# Patient Record
Sex: MALE | Race: OTHER | HISPANIC OR LATINO
[De-identification: names, ages, dates, MRNs, and addresses within clinical notes are randomized per-mention and may not be internally consistent; named-entity substitution may affect disease eponyms.]

---

## 2019-12-06 ENCOUNTER — APPOINTMENT (OUTPATIENT)
Dept: INTERNAL MEDICINE | Facility: CLINIC | Age: 49
End: 2019-12-06

## 2023-04-18 ENCOUNTER — NON-APPOINTMENT (OUTPATIENT)
Age: 53
End: 2023-04-18

## 2023-04-19 ENCOUNTER — APPOINTMENT (OUTPATIENT)
Dept: INTERNAL MEDICINE | Facility: CLINIC | Age: 53
End: 2023-04-19
Payer: COMMERCIAL

## 2023-04-19 ENCOUNTER — LABORATORY RESULT (OUTPATIENT)
Age: 53
End: 2023-04-19

## 2023-04-19 ENCOUNTER — APPOINTMENT (OUTPATIENT)
Dept: UROLOGY | Facility: CLINIC | Age: 53
End: 2023-04-19
Payer: COMMERCIAL

## 2023-04-19 VITALS
WEIGHT: 197 LBS | DIASTOLIC BLOOD PRESSURE: 81 MMHG | SYSTOLIC BLOOD PRESSURE: 134 MMHG | OXYGEN SATURATION: 98 % | HEIGHT: 67 IN | HEART RATE: 83 BPM | BODY MASS INDEX: 30.92 KG/M2 | TEMPERATURE: 96.6 F

## 2023-04-19 DIAGNOSIS — Z91.09 OTHER ALLERGY STATUS, OTHER THAN TO DRUGS AND BIOLOGICAL SUBSTANCES: ICD-10-CM

## 2023-04-19 DIAGNOSIS — Z78.9 OTHER SPECIFIED HEALTH STATUS: ICD-10-CM

## 2023-04-19 DIAGNOSIS — R35.1 NOCTURIA: ICD-10-CM

## 2023-04-19 DIAGNOSIS — R00.1 BRADYCARDIA, UNSPECIFIED: ICD-10-CM

## 2023-04-19 DIAGNOSIS — Z80.3 FAMILY HISTORY OF MALIGNANT NEOPLASM OF BREAST: ICD-10-CM

## 2023-04-19 DIAGNOSIS — Z83.3 FAMILY HISTORY OF DIABETES MELLITUS: ICD-10-CM

## 2023-04-19 DIAGNOSIS — Z87.19 PERSONAL HISTORY OF OTHER DISEASES OF THE DIGESTIVE SYSTEM: ICD-10-CM

## 2023-04-19 DIAGNOSIS — Z91.018 ALLERGY TO OTHER FOODS: ICD-10-CM

## 2023-04-19 DIAGNOSIS — N40.3 NODULAR PROSTATE WITH LOWER URINARY TRACT SYMPTOMS: ICD-10-CM

## 2023-04-19 DIAGNOSIS — Z23 ENCOUNTER FOR IMMUNIZATION: ICD-10-CM

## 2023-04-19 DIAGNOSIS — Z00.00 ENCOUNTER FOR GENERAL ADULT MEDICAL EXAMINATION W/OUT ABNORMAL FINDINGS: ICD-10-CM

## 2023-04-19 DIAGNOSIS — D12.6 BENIGN NEOPLASM OF COLON, UNSPECIFIED: ICD-10-CM

## 2023-04-19 DIAGNOSIS — N40.0 BENIGN PROSTATIC HYPERPLASIA WITHOUT LOWER URINARY TRACT SYMPMS: ICD-10-CM

## 2023-04-19 DIAGNOSIS — N13.8 NODULAR PROSTATE WITH LOWER URINARY TRACT SYMPTOMS: ICD-10-CM

## 2023-04-19 DIAGNOSIS — Z80.42 FAMILY HISTORY OF MALIGNANT NEOPLASM OF PROSTATE: ICD-10-CM

## 2023-04-19 LAB
APPEARANCE: CLEAR
BILIRUBIN URINE: NEGATIVE
BLOOD URINE: NEGATIVE
COLOR: COLORLESS
GLUCOSE QUALITATIVE U: NEGATIVE
KETONES URINE: NEGATIVE
LEUKOCYTE ESTERASE URINE: NEGATIVE
NITRITE URINE: NEGATIVE
PH URINE: 6.5
PROTEIN URINE: NEGATIVE
SPECIFIC GRAVITY URINE: 1
UROBILINOGEN URINE: NORMAL

## 2023-04-19 PROCEDURE — 90471 IMMUNIZATION ADMIN: CPT

## 2023-04-19 PROCEDURE — 90715 TDAP VACCINE 7 YRS/> IM: CPT

## 2023-04-19 PROCEDURE — 99386 PREV VISIT NEW AGE 40-64: CPT | Mod: 25

## 2023-04-19 PROCEDURE — 99204 OFFICE O/P NEW MOD 45 MIN: CPT

## 2023-04-19 PROCEDURE — 99212 OFFICE O/P EST SF 10 MIN: CPT | Mod: 25

## 2023-04-19 PROCEDURE — 93000 ELECTROCARDIOGRAM COMPLETE: CPT | Mod: 59

## 2023-04-19 NOTE — HISTORY OF PRESENT ILLNESS
[FreeTextEntry1] : Evaluation of BPH, nocturia, prominent prostate nodule on DREVery pleasant 52-year-old gentleman who presents for \par Nocturia x 2-3. Started OTC saw palmetto which helps. Drinks a lot of water.\par \par PSA 2021 0.6. A1c 5.7.

## 2023-04-19 NOTE — REVIEW OF SYSTEMS
[Negative] : Heme/Lymph [Nocturia] : nocturia [Frequency] : frequency [FreeTextEntry8] : wakes up 3 times a night to urinate

## 2023-04-19 NOTE — ASSESSMENT
[FreeTextEntry1] : Very pleasant 52-year-old gentleman who presents for evaluation of BPH, nocturia, prominent right lateral prostate on JEFFERSON\par -PSA today\par -A1c today\par -CMP\par -Urinalysis\par -Discussed the natural history of BPH, as well as typical symptoms of an enlarged prostate. Discussed potential complications that could arise from BPH, including but not limited to urinary tract infections, bladder stones, and renal impairment.\par -We discussed medication options for BPH, however at this time he wishes to continue saw palmetto with which I agree\par -Follow-up in 6 months

## 2023-04-19 NOTE — PHYSICAL EXAM
[Well Developed] : well developed [Normal Verbal Skills] : the patient had normal verbal communication skills [Normal Nonverbal Skills] : normal nonverbal communication skills were demonstrated [Conjunctiva] : the conjunctiva were normal in both eyes [PERRL] : pupils were equal in size, round, and reactive to light [EOM Intact] : extraocular movements were intact [Normal Appearance] : was normal in appearance [Neck Supple] : was supple [Normal Rhythm/Effort] : normal respiratory rhythm and effort [Clear Bilaterally] : the lungs were clear to auscultation bilaterally [Normal to Percussion] : the lungs were normal to percussion [5th Left ICS - MCL] : palpated at the 5th LICS in the midclavicular line [Heart Rate ___] : [unfilled] bpm [Rhythm Regular] : regular [Normal Rate] : normal [Normal S1] : normal S1 [Normal S2] : normal S2 [No Murmur] : no murmurs heard [No Pitting Edema] : no pitting edema present [2+] : left 2+ [No Abnormalities] : the abdominal aorta was not enlarged and no bruit was heard [Examination Of The Breasts] : a normal appearance [No Discharge] : no discharge [Soft, Nontender] : the abdomen was soft and nontender [No Mass] : no masses were palpated [No HSM] : no hepatosplenomegaly noted [None] : no CVA tenderness [Normal rectal exam] : was normal [Penis Abnormality] : normal uncircumcised penis [Testes Tenderness] : no tenderness of the testes [Testes Mass (___cm)] : there were no testicular masses [Prostate Nodule] : had a nodule [Prostate Enlarged] : was enlarged [No Lymphangitis] : no lymphangitis observed [Normal Kyphosis] : normal kyphosis [No Visual Abnormalities] : no visible abnormalities [Normal Lordosis] : normal lordosis [No Scoliosis] : no scoliosis [No Tenderness to Palpation] : no spine tenderness on palpation [No Masses] : no masses [Full ROM] : full ROM [No Pain with ROM] : no pain with motion in any direction [Intact] : all reflexes within normal limits bilaterally [Normal Station and Gait] : the gait and station were normal [Normal Motor Tone] : the muscle tone was normal [Involuntary Movements] : no involuntary movements were seen [Normal Scalp] : inspection of the scalp showed no abnormalities [Examination Of The Hair] : texture and distribution of hair was normal [Complexion Medium] : medium complexion [Normal] : the deep tendon reflexes were normal [Normal Mental Status] : the patient's orientation, memory, attention, language and fund of knowledge were normal [Appropriate] : appropriate [Well Nourished] : not well nourished [Enlarged Diffusely] : was not enlarged [JVP Elevated ___cm] : the JVP was not elevated [S3] : no S3 [S4] : no S4 [Rt] : no varicose veins of the right leg [Lt] : no varicose veins of the left leg [Right Carotid Bruit] : no bruit heard over the right carotid [Left Carotid Bruit] : no bruit heard over the left carotid [Right Femoral Bruit] : no bruit heard over the right femoral artery [Left Femoral Bruit] : no bruit heard over the left femoral artery [Bruit] : no bruit heard [Occult Blood Positive] : was negative for occult blood [Postauricular Lymph Nodes Enlarged Bilaterally] : nodes not enlarged [Preauricular Lymph Nodes Enlarged Bilaterally] : nodes not enlarged [Submandibular Lymph Nodes Enlarged Bilaterally] : nodes not enlarged [Suboccipital Lymph Nodes Enlarged Bilaterally] : nodes not enlarged [Submental Lymph Nodes Enlarged] : nodes not enlarged [Cervical Lymph Nodes Enlarged Posterior Bilaterally] : nodes not enlarged [Cervical Lymph Nodes Enlarged Anterior Bilaterally] : nodes not enlarged [Supraclavicular Lymph Nodes Enlarged Bilaterally] : nodes not enlarged [Axillary Lymph Nodes Enlarged Bilaterally] : nodes not enlarged [Epitrochlear Lymph Nodes Enlarged Bilaterally] : nodes not enlarged [Femoral Lymph Nodes Enlarged Bilaterally] : nodes not enlarged [Inguinal Lymph Nodes Enlarged Bilaterally] : nodes not enlarged [Abnormal Color] : normal color and pigmentation [Skin Lesions 1] : no skin lesions were observed [Tattoo - Single] : no tattoos observed [Skin Turgor Decreased] : normal skin turgor [Impaired judgment] : intact judgment [Impaired Insight] : intact insight [de-identified] : tongue normal  teeth in good repair

## 2023-04-19 NOTE — ASSESSMENT
[FreeTextEntry1] :   health  -    Pt needs dtap vaccine   had covid vaccines and will bring dates  also had colonoscopy  possible polyp and will obtain reports for me  .He is up to date with  eye and dental exams.  \par 2  bmi 30  Weight loss, exercise, and diet control were discussed and are highly encouraged. Treatment options were given such as, aqua therapy, and contacting a nutritionist. Recommended to use the elliptical, stationary bike, less use of treadmill. Mindful eating was explained to the patient Obesity is associated with worsening asthma, shortness of breath, and potential for cardiac disease, diabetes, and other underlying medical conditions.\par \par \par   pt should eat 60-70  gms of protein a day or 20-30 gms per meal This is fish chicken eggs lean meat such as chicken turkey pork and beef .  - Pt should not eat more than a 200 calorie snack  and make sure they are protein and fiber rich. he should eat 7 serving of protein, 12 servings of carbohydrates and 3 servings of non starchy vegetables and 4 servings of fat Dont eat unless you are physically hungry and never eat in front of a TV go to the kitchen table.  he should not eat more than a 1500 calories per day.\par 3  allergies  Once a person's trigger(s) have been identified, the next step is to reduce exposure to those specific allergens. Triggers may be present at work or at home, although for most people, the home environment is the primary source. It is especially important to reduce exposure to triggers in the bedroom because most people spend a significant number of hours there. However, to be effective, changes must be made throughout the entire home Dust mites — Dust mites are a microscopic type of insect that live in bedding, sofas, carpets, or any woven material. Dust mites do not bite and do not cause harm to humans, other than by triggering allergies.\par Mites absorb humidity from the atmosphere (ie, they do not drink) and feed on organic matter (including shed human and animal skin). They require sufficient humidity and nests to live in (which are not visible with the naked eye). Dust mite infestation is less common in dry climates, such as the [place].\par Exposure to dust mites can be reduced by encasing pillows, mattresses, box springs, comforters, and furniture in mite-impermeable barriers. When covering crib mattresses and children's mattresses and pillows, only tight-fitting, commercial covers intended for this purpose should be used. Homemade covers (for example, plastic sheeting fastened with duct tape) should not be used in children's beds, as these can come apart, and children can become trapped or suffocate. Tightly-woven fabrics with a pore size of 6 microns or less are very effective at controlling the passage of mite as well as cat allergens. Fabrics with a pore size greater than 2 microns still permit airflow Mites can be eliminated by washing sheets and blankets weekly in warm water with detergent or by drying them in an electric dryer on the hot setting Exposure can be further reduced by vacuuming with a vacuum  equipped with a high-efficiency particulate air (HEPA) filter, dusting regularly, and not sleeping on upholstered furniture (eg, couches). However, studies have yet to show that physical or chemical cleaning methods reduce mite levels to a degree that improves symptoms Indoor humidity levels should be kept between 30 and 50 percent. Inexpensive humidity monitors can be purchased at most [employer] stores. Humidifiers make the problem worse and are not recommended.\par When possible, the amount of clutter, carpet, upholstered furniture, and drapes should be minimized, and horizontal blinds should be eliminated in the rooms where the person spends the most time (bedroom, study, television room). Washable vinyl, roller-type shades are optimal. For children, the number of stuffed toys in the bedroom should be minimized.\par Animal dander — Animal dander is made up of the dead skin cells or scales (like dandruff) that are constantly shed by animals. Any breed of dog or cat is capable of being allergenic, although the levels given off by individual animals may vary to some degree. In cats, the protein that causes most people's allergies is found in the cat's saliva, skin glands, and urinary/reproductive tract. Accordingly, short-haired cats are not necessarily less allergenic than long-haired animals, and furless cats have allergen levels similar to furred cats.\par Other animals, such as rodents, birds, and ferrets, can also trigger symptoms in an allergic individual. Pets without feathers or fur, such as reptiles, turtles, and fish, rarely cause allergy, although deposits of fish food that build up under the covers of fish tanks are an excellent source of food for dust mite colonies.\par If a person is found to be allergic to a pet, the most effective option is to remove the pet from the home. Limiting an animal to a certain area in the house is not effective, because allergens are carried on clothing or spread in the air. Once a pet has left a home, careful cleaning (or removal) of carpets, sofas, curtains, and bedding must follow. This is particularly true for cat allergens because they are "sticky" and adhere to a variety of indoor surfaces. Even after a cat has been removed from a home and it has been thoroughly cleaned, it can take months for the level of cat allergen to drop. For this reason, it may take months for the person's symptoms to fully reflect the absence of the pet.\par If it is not possible to remove the animal, measures can be taken to decrease exposure to the animal dander (although none of these methods are as effective as removing the animal. Vacuum  with a HEPA filter are effective in reducing cat and dog allergen levels in the home and can reduce symptoms Rodents — Mice and rats have proteins in their urine that can cause allergies. This applies to rodents that live in a laboratory setting, as well as rodents that live in the wild.\par To reduce rodent allergen levels significantly, a combination of pest control methods, in addition to pesticides (eg, poison baits), are usually necessary. This includes keeping food and trash in covered containers, cleaning food scraps from the floor and countertops, and sealing cracks in the walls, doors, and floors.\par Cockroaches — Cockroach droppings contain allergens that can trigger asthma and allergic rhinitis in sensitive individuals. Cockroaches thrive in warm, moist environments with easily accessible food and water. Unfortunately, efforts to control cockroach populations in infested areas are often less than successful. Still, certain measures are recommended:\par ?Use multiple baited traps or poisons\par ?Remove garbage and food waste promptly from the home\par ?Wash dishes and cooking utensils immediately after use\par ?Remove cockroach debris quickly\par ?Eliminate any standing water from leaking faucets or drains\par ?Keep humidity levels less than 50 percent with a dehumidifier or air conditioner\par ?Consult a professional  for large or recurrent infestations\par  ladybugs — Asian ladybugs were previously imported to the [place] as a biologic means of controlling aphids. It was anticipated that the insects would not survive the cold of winter. However, they adapted by moving inside houses when temperatures drop in the early fall. Allergies to Asian ladybugs have been increasingly reported as a source of seasonal indoor respiratory symptoms, particularly chronic cough, rhinitis, and asthma. Most cases have been reported in rural areas of the central, [place]western, and [place]. The insects can also bite and cause local reactions.\par  ladybugs enter homes through external cracks and crevices and then infest spaces within walls. They secrete a brown liquid that may stain walls and produce an unpleasant smell.\par Treating the exterior of the house with a chemical (pyrethroids) before cold weather arrives can prevent swarming of the ladybugs inside the home. Pyrethroids are similar to pyrethrins, which are derived from marigold flowers. Pyrethrins are broken down by the sun and do not significantly affect groundwater quality.\par Indoor molds — Mold spores can trigger symptoms of allergic rhinitis in allergic patients. Mold thrives in damp environments. Areas, such as air conditioning vents, water traps, refrigerator drip trays, shower stalls, leaky sinks, and damp basements, are particularly vulnerable to mold growth if not cleaned regularly. Most of the mold spores enter the home from the outside air. However, under certain circums\par testing will be done \par 4 prostate nodule    pt will see urologist psa     he actual cause of prostate enlargement is unknown. Factors linked to aging and changes in the cells of the testicles may have a role in the growth of the gland. Men who have had their testicles removed at a young age (for example, as a result of testicular cancer) do not develop BPH.\par \par Also, if the testicles are removed after a man develops BPH, the prostate begins to shrink in size.\par \par Some facts about prostate enlargement:\par •The likelihood of developing an enlarged prostate increases with age.\par •BPH is so common that it has been said all men will have an enlarged prostate if they live long enough.\par •A small amount of prostate enlargement is present in many men over age 40. More than 90% of men over age 80 have the condition.\par •No risk factors have been identified other than having normally functioning testicles. \par \par \par \par \par \par \par \par Symptoms\par \par \par \par \par \par \par Less than half of all men with BPH have symptoms of the disease. Symptoms may include:\par •Dribbling at the end of urinating\par •Inability to urinate (urinary retention)\par •Incomplete emptying of your bladder\par •Incontinence\par •Needing to urinate 2 or more times per night\par •Pain with urination or bloody urine (these may indicate infection)\par •Slowed or delayed start of the urinary stream\par •Straining to urinate\par •Strong and sudden urge to urinate\par •Weak urine stream \par \par \par \par \par \par Exams and Tests\par \par \par \par \par \par \par Your health care provider will ask you questions about your medical history and do a digital rectal exam to feel the prostate gland. Other tests you may have include: \par •Urine flow rate\par •Post-void residual urine test to see how much urine is left in your bladder after you urinate\par •Pressure-flow studies to measure the pressure in the bladder as you urinate\par •Urinalysis to check for blood or infection\par •Urine culture to check for infection\par •Prostate-specific antigen (PSA) blood test to screen for prostate cancer\par •Cystoscopy\par \par You may be asked to fill out a form to rate how bad your symptoms are and how much they affect your daily life. Your doctor can use this score to  if your condition is getting worse over time.\par \par \par \par \par \par Treatment\par \par \par \par \par \par \par The treatment you choose will be based on how bad your symptoms are and how much they bother you. Your provider will also take into account other medical problems you may have.\par \par Treatment options include "watchful waiting," lifestyle changes, medicines, or surgery.\par \par If you are over 60, you are more likely to have symptoms. But many men with an enlarged prostate have only minor symptoms. Self-care steps are often enough to make you feel better.\par \par If you have BPH, you should have a yearly exam to monitor your symptoms and see if you need changes in treatment. \par \par SELF-CARE\par \par For mild symptoms:\par •Urinate when you first get the urge. Also, go to the bathroom on a timed schedule even if you don't feel a need to urinate.\par •Avoid alcohol and caffeine, especially after dinner.\par •DO NOT drink a lot of fluid all at once. Spread out fluids during the day. Avoid drinking fluids within 2 hours of bedtime.\par •Try NOT to take over-the-counter cold and sinus medicines that contain decongestants or antihistamines. These drugs can increase BPH symptoms.\par •Keep warm and exercise regularly. Cold weather and lack of physical activity may worsen symptoms.\par •Learn and perform Kegel exercises (pelvic strengthening exercises).\par •Reduce stress. Nervousness and tension can lead to more frequent urination. \par \par MEDICINES\par \par Alpha-1 blockers are a class of drugs that are also used to treat high blood pressure. These medicines relax the muscles of the bladder neck and prostate. This allows easier urination. Most people who take alpha-1 blockers notice improvement in their symptoms.\par \par Finasteride and dutasteride lower levels of hormones produced by the prostate. These drugs also reduce the size of the gland, increase urine flow rate, and decrease symptoms of BPH. You may need to take these medicines for 3 to 6 months before you notice symptoms getting better. Possible side effects include decreased sex drive and impotence.\par \par Antibiotics may be prescribed to treat chronic prostatitis (inflammation of the prostate), which may occur with BPH. BPH symptoms improve in some men after a course of antibiotics.\par \par Watch out for drugs that may make your symptoms worse:\par \par SAW PALMETTO\par \par Many herbs have been tried for treating an enlarged prostate. Many men use saw palmetto to ease symptoms. Some studies have shown that it may help with symptoms, but results are mixed and more research is needed. If you use saw palmetto and think it works, ask your doctor if you should still take it.\par \par SURGERY\par \par Prostate surgery may be recommended if you have:\par •Incontinence\par •Recurrent blood in the urine\par •Inability to fully empty the bladder (urinary retention)\par •Recurrent urinary tract infections\par •Decreasing kidney function\par •Bladder stones\par •Bothersome symptoms not responding to medicines \par \par The choice of which surgical procedure is recommended is most often based on the severity of your symptoms and the size and shape of your prostate gland. Most men who have prostate surgery have improvement in urine flow rates and symptoms.\par \par Transurethral resection of the prostate (TURP): This is the most common and most proven surgical treatment for BPH. TURP is performed by inserting a scope through the penis and removing the prostate piece by piece.\par \par Simple prostatectomy: It is a procedure to remove the inside part of the prostate gland. It is done through a surgical cut in your lower belly. This treatment is most often done on men who have very large prostate glands. \par \par Other, less-invasive procedures use heat to destroy prostate tissue. None have been proven to be better than TURP. People who receive these procedures are more likely to need surgery again after 5 or 10 years. However, these procedures may be a choice for:\par •Younger men (many of the less-invasive procedures carry a lower risk for impotence and incontinence than TURP, although the risk with TURP is not very high)\par •Older people\par •People with severe medical conditions, including uncontrolled diabetes, cirrhosis, alcoholism, psychosis, and serious lung, kidney, or heart disease\par •Men who are taking blood-thinning drugs \par \par \par \par \par \par Support Groups\par \par \par \par \par \par \par Some men may find it helpful to take part in a BPH support groups. \par \par \par \par \par \par Possible Complications\par \par \par \par \par \par \par Men who have had BPH for long time with slowly worsening symptom may develop:\par •Sudden inability to urinate\par •Urinary tract infections\par •Urinary stones\par •Damage to the kidneys\par •Blood in the urine \par \par BPH may come back over time even after having surgery. \par \par \par \par \par \par When to Contact a Medical Professional\par \par \par \par \par \par \par Call your provider right away if you have:\par •Less urine than usual\par •Fever or chills\par •Back, side, or abdominal pain\par •Blood or pus in your urine \par \par Also call if:\par •Your bladder does not feel completely empty after you urinate.\par •You take medicines that may cause urinary problems such as diuretics, antihistamines, antidepressants, or sedatives. DO NOT stop or change your medicines without talking to your doctor.\par •You have tried self-care steps for 2 months and symptoms have not improved. \par Alternative Names\par BPH; Benign prostatic hyperplasia (hypertrophy); Prostate - enlarged\par Patient Instructions\par •Enlarged prostate - what to ask your doctor \par •Prostate resection - minimally invasive - discharge\par •Transurethral resection of the prostate - discharge\par 5.  sinus enio cardia and incomplete rbbb  refer to cardiologist  ekg  rate 56   bradycardia  incomplete rbbb  qrs  102 qt  402/387ms pr  164 ms p  106 ms  \par 6 tdap  vaccine  discussed  risks pros and cons  will have vaccine NAME: Boostrix 5-2.5-18.5 SUSP\par \par GENERIC NAME:  Diphtheria and Tetanus Toxoids, and Acellular Pertussis Vaccine (dif THEER ee a & TET a nus MONA kim, & ay CRISTINO yazan lar per TUS sis vak SEEN) \par \par COMMON USES:  It is used to prevent diphtheria, tetanus, and pertussis. \par \par HOW TO USE THIS MEDICINE:  HOW IS THIS DRUG BEST TAKEN? Use this drug as ordered by your doctor. Read all information given to you. Follow all instructions closely. It is given as a shot into a muscle. HOW DO I STORE AND/OR THROW OUT THIS DRUG? If you need to store this drug at home, talk with your doctor, nurse, or pharmacist about how to store it. WHAT DO I DO IF I MISS A DOSE? Call your doctor to find out what to do. \par \par CAUTIONS:  For all patients taking this drug: Tell all of your health care providers that you take this drug. This includes your doctors, nurses, pharmacists, and dentists. This drug may not protect all people who use it. Talk with the doctor. If you have a latex allergy, talk with your doctor. Some products have latex. Not all brands of vaccines are for children. Talk with your child's doctor. Tell your doctor if you are pregnant, plan on getting pregnant, or are breast-feeding. You will need to talk about the benefits and risks to you and the baby. Infants: If your child was born premature, talk with the doctor. Trouble breathing has happened in these children after getting some vaccines. \par \par POSSIBLE SIDE EFFECTS:  WHAT ARE SOME SIDE EFFECTS THAT I NEED TO CALL MY DOCTOR ABOUT RIGHT AWAY? WARNING/CAUTION: Even though it may be rare, some people may have very bad and sometimes deadly side effects when taking a drug. Tell your doctor or get medical help right away if you have any of the following signs or symptoms that may be related to a very bad side effect: Signs of an allergic reaction, like rash; hives; itching; red, swollen, blistered, or peeling skin with or without fever; wheezing; tightness in the chest or throat; trouble breathing, swallowing, or talking; unusual hoarseness; or swelling of the mouth, face, lips, tongue, or throat. Feeling confused. Very bad dizziness or passing out. Change in eyesight. Seizures. A burning, numbness, or tingling feeling that is not normal. Weakness. Trouble controlling body movements. High fever. WHAT ARE SOME OTHER SIDE EFFECTS OF THIS DRUG? All drugs may cause side effects. However, many people have no side effects or only have minor side effects. Call your doctor or get medical help if any of these side effects or any other side effects bother you or do not go away: For all patients taking this drug: Pain, redness, or swelling where the shot was given. Headache. Feeling tired or weak. Mild fever. Chills. Upset stomach or throwing up. Stomach pain or diarrhea. Joint pain or swelling. Swollen gland. Young children: Feeling fussy. Decreased appetite. Feeling sleepy. Crying that is not normal. These are not all of the side effects that may occur. If you have questions about side effects, call your doctor. Call your doctor for medical advice about side effects. Report side effects to the FDA/CDC Vaccine Adverse Event Reporting System (VAERS) at https://vaers.hhs.gov/reportevent.html or by calling 1-932.281.7183. \par \par BEFORE USING THIS MEDICINE:  WHAT DO I NEED TO TELL MY DOCTOR BEFORE I TAKE THIS DRUG? TELL YOUR DOCTOR: If you are allergic to this drug; any part of this drug; or any other drugs, foods, or substances. Tell your doctor about the allergy and what signs you had. TELL YOUR DOCTOR: If you have seizures or any other brain or nervous system problem. TELL YOUR DOCTOR: If you have had a brain problem like coma, lowered level of awareness, or seizures from an unknown cause within 7 days of a previous vaccine that has pertussis. This is not a list of all drugs or health problems that interact with this drug. Tell your doctor and pharmacist about all of your drugs (prescription or OTC, natural products, vitamins) and health problems. You must check to make sure that it is safe for you to take this drug with all of your drugs and health problems. Do not start, stop, or change the dose of any drug without checking with your doctor. \par \par OVERDOSE:  If you think there has been an overdose, call your poison control center or get medical care right away. Be ready to tell or show what was taken, how much, and when it happened. \par \par ADDITIONAL INFORMATION:  If your symptoms or health problems do not get better or if they become worse, call your doctor. Do not share your drugs with others and do not take anyone else's drugs. Keep all drugs in a safe place. Keep all drugs out of the reach of children and pets. Throw away unused or  drugs. Do not flush down a toilet or pour down a drain unless you are told to do so. Check with your pharmacist if you have questions about the best way to throw out drugs. There may be drug take-back programs in your area. Some drugs may have another patient information leaflet. Check with your pharmacist. If you have any questions about this drug, please talk with your doctor, nurse, pharmacist, or other health care provider. \par \par Copyright 3 [employer] All Rights Reserved.     \par

## 2023-04-19 NOTE — HISTORY OF PRESENT ILLNESS
[FreeTextEntry1] : new pt   [de-identified] : Pt is a  52 yr old man who states when he eats  diary  , cheese he gets hives  and has  seasonal allergies and gets hives  congestion and  watery eye  which he states has improved.  He states he gets  hives  with certain meats as well .  He  -denies any headaches, nausea, vomiting, fever, chills, sweats, chest pain, chest pressure, diarrhea, constipation, dysphagia, sour taste in the mouth, dizziness, leg swelling, leg pain, myalgias, arthralgias, itchy eyes, itchy ears, heartburn, or reflux.\par \par \par

## 2023-04-19 NOTE — HEALTH RISK ASSESSMENT
[Excellent] : ~his/her~ current health as excellent [Very Good] : ~his/her~  mood as very good [Yes] : Yes [2 - 4 times a month (2 pts)] : 2-4 times a month (2 points) [1 or 2 (0 pts)] : 1 or 2 (0 points) [No] : In the past 12 months have you used drugs other than those required for medical reasons? No [Never (0 pts)] : Never (0 points) [No falls in past year] : Patient reported no falls in the past year [0] : 1) Little interest or pleasure doing things: Not at all (0) [PHQ-2 Negative - No further assessment needed] : PHQ-2 Negative - No further assessment needed [Patient reported colonoscopy was abnormal] : Patient reported colonoscopy was abnormal [HIV test declined] : HIV test declined [Hepatitis C test offered] : Hepatitis C test offered [None] : None [With Family] : lives with family [# of Members in Household ___] :  household currently consist of [unfilled] member(s) [Employed] : employed [College] : College [] :  [# Of Children ___] : has [unfilled] children [Sexually Active] : sexually active [Feels Safe at Home] : Feels safe at home [Fully functional (bathing, dressing, toileting, transferring, walking, feeding)] : Fully functional (bathing, dressing, toileting, transferring, walking, feeding) [Fully functional (using the telephone, shopping, preparing meals, housekeeping, doing laundry, using] : Fully functional and needs no help or supervision to perform IADLs (using the telephone, shopping, preparing meals, housekeeping, doing laundry, using transportation, managing medications and managing finances) [Smoke Detector] : smoke detector [Carbon Monoxide Detector] : carbon monoxide detector [Safety elements used in home] : safety elements used in home [Seat Belt] :  uses seat belt [Sunscreen] : uses sunscreen [Never] : Never [FreeTextEntry1] : see above  [Audit-CScore] : 2 [de-identified] :   he exercises  three times a week  ,  weights  follows  routine.    [de-identified] : healthy  [[id number]] : 0 [Change in mental status noted] : No change in mental status noted [Language] : denies difficulty with language [Behavior] : denies difficulty with behavior [Learning/Retaining New Information] : denies difficulty learning/retaining new information [Handling Complex Tasks] : denies difficulty handling complex tasks [Reasoning] : denies difficulty with reasoning [Spatial Ability and Orientation] : denies difficulty with spatial ability and orientation [Reports changes in hearing] : Reports no changes in hearing [Reports changes in vision] : Reports no changes in vision [Reports changes in dental health] : Reports no changes in dental health [Guns at Home] : no guns at home [Travel to Developing Areas] : does not  travel to developing areas [TB Exposure] : is not being exposed to tuberculosis [Caregiver Concerns] : does not have caregiver concerns [ColonoscopyDate] : 10/22 [ColonoscopyComments] : unsure  he thinks he had a polyp [FreeTextEntry2] :   [de-identified] : wears  glasses last eye exam  12/22 [de-identified] : last dental  11/22 [AdvancecareDate] : 4/23 [de-identified] : occ  cigar  once or twice a yr if that

## 2023-04-19 NOTE — COUNSELING
[Fall prevention counseling provided] : Fall prevention counseling provided [Adequate lighting] : Adequate lighting [No throw rugs] : No throw rugs [Use proper foot wear] : Use proper foot wear [Use recommended devices] : Use recommended devices [Sleep ___ hours/day] : Sleep [unfilled] hours/day [Engage in a relaxing activity] : Engage in a relaxing activity [Plan in advance] : Plan in advance [Potential consequences of obesity discussed] : Potential consequences of obesity discussed [Benefits of weight loss discussed] : Benefits of weight loss discussed [Structured Weight Management Program suggested:] : Structured weight management program suggested [Encouraged to maintain food diary] : Encouraged to maintain food diary [Encouraged to increase physical activity] : Encouraged to increase physical activity [Encouraged to use exercise tracking device] : Encouraged to use exercise tracking device [Target Wt Loss Goal ___] : Weight Loss Goals: Target weight loss goal [unfilled] lbs [Weigh Self Weekly] : weigh self weekly [Decrease Portions] : decrease portions [____ min/wk Activity] : [unfilled] min/wk activity [Keep Food Diary] : keep food diary [None] : None [Good understanding] : Patient has a good understanding of lifestyle changes and steps needed to achieve self management goal [FreeTextEntry1] : low fat low romel [FreeTextEntry2] : ideal  [id number]

## 2023-04-19 NOTE — PHYSICAL EXAM
[General Appearance - Well Developed] : well developed [General Appearance - Well Nourished] : well nourished [Normal Appearance] : normal appearance [Well Groomed] : well groomed [General Appearance - In No Acute Distress] : no acute distress [Edema] : no peripheral edema [Respiration, Rhythm And Depth] : normal respiratory rhythm and effort [Exaggerated Use Of Accessory Muscles For Inspiration] : no accessory muscle use [Abdomen Soft] : soft [Abdomen Tenderness] : non-tender [Costovertebral Angle Tenderness] : no ~M costovertebral angle tenderness [Urethral Meatus] : meatus normal [Urinary Bladder Findings] : the bladder was normal on palpation [Scrotum] : the scrotum was normal [Testes Mass (___cm)] : there were no testicular masses [Normal Station and Gait] : the gait and station were normal for the patient's age [] : no rash [No Focal Deficits] : no focal deficits [Oriented To Time, Place, And Person] : oriented to person, place, and time [Affect] : the affect was normal [Mood] : the mood was normal [Not Anxious] : not anxious [No Palpable Adenopathy] : no palpable adenopathy [FreeTextEntry1] : Soft, nontender, and a prominent right lateral prostate, no concern for firm nodule indicative of malignancy

## 2023-04-20 LAB
ALBUMIN SERPL ELPH-MCNC: 4.8 G/DL
ALP BLD-CCNC: 81 U/L
ALT SERPL-CCNC: 16 U/L
ANION GAP SERPL CALC-SCNC: 11 MMOL/L
AST SERPL-CCNC: 25 U/L
BARLEY IGE QN: 1.44 KUA/L
BASOPHILS # BLD AUTO: 0.04 K/UL
BASOPHILS NFR BLD AUTO: 0.5 %
BILIRUB SERPL-MCNC: 1.1 MG/DL
BUN SERPL-MCNC: 9 MG/DL
CALCIUM SERPL-MCNC: 10.1 MG/DL
CHERRY IGE QN: 1.44 KUA/L
CHLORIDE SERPL-SCNC: 102 MMOL/L
CHOLEST SERPL-MCNC: 185 MG/DL
CO2 SERPL-SCNC: 26 MMOL/L
COW MILK IGE QN: <0.1 KUA/L
CRAB IGE QN: 1.18 KUA/L
CREAT SERPL-MCNC: 1.05 MG/DL
DEPRECATED BARLEY IGE RAST QL: 2
DEPRECATED CHERRY IGE RAST QL: 2
DEPRECATED COW MILK IGE RAST QL: 0
DEPRECATED CRAB IGE RAST QL: 2
DEPRECATED EGG WHITE IGE RAST QL: 0
DEPRECATED OAT IGE RAST QL: NORMAL
DEPRECATED PEANUT IGE RAST QL: 1
DEPRECATED RYE IGE RAST QL: NORMAL
DEPRECATED SOYBEAN IGE RAST QL: NORMAL
DEPRECATED WHEAT IGE RAST QL: 2
EGFR: 85 ML/MIN/1.73M2
EGG WHITE IGE QN: <0.1 KUA/L
EOSINOPHIL # BLD AUTO: 0.31 K/UL
EOSINOPHIL NFR BLD AUTO: 4 %
ESTIMATED AVERAGE GLUCOSE: 111 MG/DL
FRUCTOSAMINE SERPL-MCNC: 242 UMOL/L
GLUCOSE SERPL-MCNC: 83 MG/DL
HBA1C MFR BLD HPLC: 5.5 %
HBV CORE IGG+IGM SER QL: NONREACTIVE
HBV SURFACE AB SER QL: NONREACTIVE
HBV SURFACE AG SER QL: NONREACTIVE
HCT VFR BLD CALC: 46.7 %
HCV AB SER QL: NONREACTIVE
HCV S/CO RATIO: 0.08 S/CO
HDLC SERPL-MCNC: 39 MG/DL
HEPATITIS A IGG ANTIBODY: NONREACTIVE
HGB BLD-MCNC: 14.9 G/DL
IMM GRANULOCYTES NFR BLD AUTO: 0.5 %
LDLC SERPL CALC-MCNC: 115 MG/DL
LYMPHOCYTES # BLD AUTO: 1.91 K/UL
LYMPHOCYTES NFR BLD AUTO: 24.4 %
MAN DIFF?: NORMAL
MCHC RBC-ENTMCNC: 27.8 PG
MCHC RBC-ENTMCNC: 31.9 GM/DL
MCV RBC AUTO: 87.1 FL
MONOCYTES # BLD AUTO: 0.61 K/UL
MONOCYTES NFR BLD AUTO: 7.8 %
NEUTROPHILS # BLD AUTO: 4.93 K/UL
NEUTROPHILS NFR BLD AUTO: 62.8 %
NONHDLC SERPL-MCNC: 146 MG/DL
OAT IGE QN: 0.22 KUA/L
PEANUT IGE QN: 0.42 KUA/L
PLATELET # BLD AUTO: 286 K/UL
POTASSIUM SERPL-SCNC: 4.6 MMOL/L
PROT SERPL-MCNC: 7.7 G/DL
PSA SERPL-MCNC: 0.66 NG/ML
RBC # BLD: 5.36 M/UL
RBC # FLD: 12.7 %
RYE IGE QN: 0.13 KUA/L
SODIUM SERPL-SCNC: 139 MMOL/L
SOYBEAN IGE QN: 0.16 KUA/L
T PALLIDUM AB SER QL IA: NEGATIVE
TOTAL IGE SMQN RAST: 1195 KU/L
TRIGL SERPL-MCNC: 155 MG/DL
WBC # FLD AUTO: 7.84 K/UL
WHEAT IGE QN: 0.88 KUA/L

## 2023-04-23 LAB
A ALTERNATA IGE QN: 0.17 KUA/L
A FUMIGATUS IGE QN: <0.1 KUA/L
BERMUDA GRASS IGE QN: 0.13 KUA/L
BOXELDER IGE QN: 0.19 KUA/L
C HERBARUM IGE QN: <0.1 KUA/L
CALIF WALNUT IGE QN: 0.36 KUA/L
CAT DANDER IGE QN: 0.17 KUA/L
CMN PIGWEED IGE QN: 0.14 KUA/L
COMMON RAGWEED IGE QN: 0.8 KUA/L
COTTONWOOD IGE QN: 1.63 KUA/L
D FARINAE IGE QN: 6.92 KUA/L
D PTERONYSS IGE QN: 5.98 KUA/L
DEPRECATED A ALTERNATA IGE RAST QL: NORMAL
DEPRECATED A FUMIGATUS IGE RAST QL: 0
DEPRECATED BERMUDA GRASS IGE RAST QL: NORMAL
DEPRECATED BOXELDER IGE RAST QL: NORMAL
DEPRECATED C HERBARUM IGE RAST QL: 0
DEPRECATED CAT DANDER IGE RAST QL: NORMAL
DEPRECATED COMMON PIGWEED IGE RAST QL: NORMAL
DEPRECATED COMMON RAGWEED IGE RAST QL: 2
DEPRECATED COTTONWOOD IGE RAST QL: 2
DEPRECATED D FARINAE IGE RAST QL: 3
DEPRECATED D PTERONYSS IGE RAST QL: 3
DEPRECATED DOG DANDER IGE RAST QL: NORMAL
DEPRECATED GOOSEFOOT IGE RAST QL: NORMAL
DEPRECATED LONDON PLANE IGE RAST QL: 2
DEPRECATED MOUSE URINE PROT IGE RAST QL: 0
DEPRECATED MUGWORT IGE RAST QL: NORMAL
DEPRECATED P NOTATUM IGE RAST QL: 0
DEPRECATED RED CEDAR IGE RAST QL: NORMAL
DEPRECATED ROACH IGE RAST QL: 2
DEPRECATED SHEEP SORREL IGE RAST QL: NORMAL
DEPRECATED SILVER BIRCH IGE RAST QL: 3
DEPRECATED TIMOTHY IGE RAST QL: NORMAL
DEPRECATED WHITE ASH IGE RAST QL: NORMAL
DEPRECATED WHITE OAK IGE RAST QL: 3
DOG DANDER IGE QN: 0.14 KUA/L
GOOSEFOOT IGE QN: 0.28 KUA/L
LONDON PLANE IGE QN: 1.53 KUA/L
MOUSE URINE PROT IGE QN: <0.1 KUA/L
MUGWORT IGE QN: 0.16 KUA/L
MULBERRY (T70) CLASS: 1
MULBERRY (T70) CONC: 0.41 KUA/L
P NOTATUM IGE QN: <0.1 KUA/L
RED CEDAR IGE QN: 0.2 KUA/L
ROACH IGE QN: 2.85 KUA/L
SHEEP SORREL IGE QN: 0.13 KUA/L
SILVER BIRCH IGE QN: 5.07 KUA/L
TIMOTHY IGE QN: 0.15 KUA/L
TREE ALLERG MIX1 IGE QL: 1
WHITE ASH IGE QN: 0.29 KUA/L
WHITE ELM IGE QN: 0.51 KUA/L
WHITE ELM IGE QN: 1
WHITE OAK IGE QN: 6.47 KUA/L

## 2023-07-11 ENCOUNTER — APPOINTMENT (OUTPATIENT)
Dept: INTERNAL MEDICINE | Facility: CLINIC | Age: 53
End: 2023-07-11

## 2023-08-03 ENCOUNTER — APPOINTMENT (OUTPATIENT)
Dept: INTERNAL MEDICINE | Facility: CLINIC | Age: 53
End: 2023-08-03

## 2023-10-20 ENCOUNTER — APPOINTMENT (OUTPATIENT)
Dept: UROLOGY | Facility: CLINIC | Age: 53
End: 2023-10-20

## 2023-12-04 ENCOUNTER — NON-APPOINTMENT (OUTPATIENT)
Age: 53
End: 2023-12-04

## 2023-12-04 ENCOUNTER — APPOINTMENT (OUTPATIENT)
Dept: CARDIOLOGY | Facility: CLINIC | Age: 53
End: 2023-12-04
Payer: COMMERCIAL

## 2023-12-04 ENCOUNTER — TRANSCRIPTION ENCOUNTER (OUTPATIENT)
Age: 53
End: 2023-12-04

## 2023-12-04 VITALS
SYSTOLIC BLOOD PRESSURE: 112 MMHG | TEMPERATURE: 97.1 F | WEIGHT: 198 LBS | HEART RATE: 70 BPM | HEIGHT: 67 IN | BODY MASS INDEX: 31.08 KG/M2 | OXYGEN SATURATION: 97 % | DIASTOLIC BLOOD PRESSURE: 66 MMHG

## 2023-12-04 DIAGNOSIS — E78.2 MIXED HYPERLIPIDEMIA: ICD-10-CM

## 2023-12-04 DIAGNOSIS — I45.10 UNSPECIFIED RIGHT BUNDLE-BRANCH BLOCK: ICD-10-CM

## 2023-12-04 DIAGNOSIS — Z13.6 ENCOUNTER FOR SCREENING FOR CARDIOVASCULAR DISORDERS: ICD-10-CM

## 2023-12-04 DIAGNOSIS — R06.09 OTHER FORMS OF DYSPNEA: ICD-10-CM

## 2023-12-04 PROCEDURE — 99204 OFFICE O/P NEW MOD 45 MIN: CPT | Mod: 25

## 2023-12-04 PROCEDURE — 93000 ELECTROCARDIOGRAM COMPLETE: CPT

## 2023-12-07 PROBLEM — R06.09 EXERTIONAL DYSPNEA: Status: ACTIVE | Noted: 2023-12-07

## 2023-12-07 PROBLEM — Z13.6 SCREENING FOR CARDIOVASCULAR CONDITION: Status: ACTIVE | Noted: 2023-12-07

## 2024-04-13 ENCOUNTER — TRANSCRIPTION ENCOUNTER (OUTPATIENT)
Age: 54
End: 2024-04-13